# Patient Record
Sex: FEMALE | Race: BLACK OR AFRICAN AMERICAN | NOT HISPANIC OR LATINO | URBAN - METROPOLITAN AREA
[De-identification: names, ages, dates, MRNs, and addresses within clinical notes are randomized per-mention and may not be internally consistent; named-entity substitution may affect disease eponyms.]

---

## 2018-10-05 ENCOUNTER — EMERGENCY (EMERGENCY)
Facility: HOSPITAL | Age: 43
LOS: 1 days | Discharge: HOME | End: 2018-10-05
Attending: EMERGENCY MEDICINE | Admitting: EMERGENCY MEDICINE
Payer: COMMERCIAL

## 2018-10-05 VITALS
HEART RATE: 71 BPM | DIASTOLIC BLOOD PRESSURE: 111 MMHG | SYSTOLIC BLOOD PRESSURE: 165 MMHG | RESPIRATION RATE: 18 BRPM | OXYGEN SATURATION: 99 % | TEMPERATURE: 98 F

## 2018-10-05 DIAGNOSIS — I10 ESSENTIAL (PRIMARY) HYPERTENSION: ICD-10-CM

## 2018-10-05 DIAGNOSIS — H57.89 OTHER SPECIFIED DISORDERS OF EYE AND ADNEXA: ICD-10-CM

## 2018-10-05 DIAGNOSIS — T78.40XA ALLERGY, UNSPECIFIED, INITIAL ENCOUNTER: ICD-10-CM

## 2018-10-05 PROCEDURE — 99283 EMERGENCY DEPT VISIT LOW MDM: CPT

## 2018-10-05 RX ORDER — FLUORESCEIN SODIUM 9 MG
1 STRIP OPHTHALMIC (EYE) ONCE
Qty: 0 | Refills: 0 | Status: COMPLETED | OUTPATIENT
Start: 2018-10-05 | End: 2018-10-05

## 2018-10-05 RX ORDER — KETOTIFEN FUMARATE 0.34 MG/ML
1 SOLUTION OPHTHALMIC
Qty: 1 | Refills: 0 | OUTPATIENT
Start: 2018-10-05 | End: 2018-10-14

## 2018-10-05 RX ORDER — LORATADINE 10 MG/1
1 TABLET ORAL
Qty: 14 | Refills: 0 | OUTPATIENT
Start: 2018-10-05

## 2018-10-05 RX ORDER — LISINOPRIL 2.5 MG/1
1 TABLET ORAL
Qty: 30 | Refills: 0 | OUTPATIENT
Start: 2018-10-05 | End: 2018-11-03

## 2018-10-05 RX ORDER — DIPHENHYDRAMINE HCL 50 MG
1 CAPSULE ORAL
Qty: 21 | Refills: 0 | OUTPATIENT
Start: 2018-10-05 | End: 2018-10-11

## 2018-10-05 RX ADMIN — Medication 1 DROP(S): at 12:20

## 2018-10-05 RX ADMIN — Medication 1 APPLICATION(S): at 12:19

## 2018-10-05 NOTE — ED PROVIDER NOTE - MEDICAL DECISION MAKING DETAILS
Given onset and current exam the etiology of sxs is likely allergic and so will treat accordingly.  No other body systems affected at this time but discussed strict return precautions.  Instructed to f/u with ophtho for re-evaluation.  No e/o infection at this time.

## 2018-10-05 NOTE — ED ADULT NURSE NOTE - CHIEF COMPLAINT QUOTE
pt was at work at [employer] [employer] when a gnat flew into her eye; swelling present no vision changes, hx htn on lisinopril

## 2018-10-05 NOTE — ED PROVIDER NOTE - CHIEF COMPLAINT
The patient is a 43y Female complaining of eye redness. The patient is a 43y Female complaining of eye swelling.

## 2018-10-05 NOTE — ED ADULT TRIAGE NOTE - CHIEF COMPLAINT QUOTE
pt was at work at [employer] when a gnat flew into her eye; swelling present no vision changes, hx htn on lisinopril

## 2018-10-05 NOTE — ED PROVIDER NOTE - PHYSICAL EXAMINATION
GEN: Well appearing, well nourished, awake, alert, oriented to person, place, time/situation and in no apparent distress.  ENT: Airway patent, Nasal mucosa clear. Mouth with normal mucosa.  EYES: Clear bilaterally.  R eye - no corneal abrasions seen on fluorecin test, no cells or flare seen on slit lamp, Snellen is 20/20.  +Edema to R orbit, minimal redness.  No discharge.   RESPIRATORY: Breathing comfortably with normal RR.  MSK: Range of motion is not limited, no deformities noted.  NEURO: Alert and oriented, no focal deficits.  SKIN: Skin normal color for race, warm, dry and intact. No evidence of rash.  PSYCH: Alert and oriented to person, place, time/situation. normal mood and affect. no apparent risk to self or others.

## 2018-10-05 NOTE — ED PROVIDER NOTE - OBJECTIVE STATEMENT
Pt is a 44yo F with a h/o HTN (on Lisinopril 5mg daily and took today).  Reports swelling to R eye while at work today.  Reports acute onset and wonders if something got into her eye.  Denies any pain to the eyeball itself.  Describes a "tightness" to the orbit from the swelling.  Mild redness to the orbit but not to the eyeball.  Denies any FB sensation.  Denies any HA.  Denies any blurry vision or loss in vision.  Denies any throat/tongue/lip swelling, SOb, wheezing, hives, itching, abd pain, N/V/D, or other concerns.  Denies any glasses or contact lens use.

## 2021-09-07 PROBLEM — Z00.00 ENCOUNTER FOR PREVENTIVE HEALTH EXAMINATION: Status: ACTIVE | Noted: 2021-09-07

## 2021-09-07 PROBLEM — I10 ESSENTIAL (PRIMARY) HYPERTENSION: Chronic | Status: ACTIVE | Noted: 2018-10-05

## 2021-12-20 ENCOUNTER — APPOINTMENT (OUTPATIENT)
Dept: INTERNAL MEDICINE | Facility: CLINIC | Age: 46
End: 2021-12-20

## 2024-05-13 NOTE — ED PROVIDER NOTE - NSFOLLOWUPINSTRUCTIONS_ED_ALL_ED_FT
Come back to the emergency department if severe chest pain, severe shortness of breath, persistence of fever, intractable vomiting, severe dizziness or lightheadedness, fainting.  Schedule an appointment with your primary care physician as recommended previously.  Read the documents attached.    Please use the medications prescribed.  They are all over the counter so the pharmacist may recommend buying them over the counter.  Please return here immediately for any worsening swelling, eye pain, vision loss, throat swelling, trouble breathing, or any other concerns.